# Patient Record
Sex: MALE | Race: WHITE | NOT HISPANIC OR LATINO | Employment: FULL TIME | URBAN - METROPOLITAN AREA
[De-identification: names, ages, dates, MRNs, and addresses within clinical notes are randomized per-mention and may not be internally consistent; named-entity substitution may affect disease eponyms.]

---

## 2020-07-09 ENCOUNTER — OFFICE VISIT (OUTPATIENT)
Dept: URGENT CARE | Facility: CLINIC | Age: 65
End: 2020-07-09
Payer: COMMERCIAL

## 2020-07-09 VITALS
HEIGHT: 66 IN | OXYGEN SATURATION: 96 % | DIASTOLIC BLOOD PRESSURE: 98 MMHG | WEIGHT: 175.6 LBS | HEART RATE: 70 BPM | SYSTOLIC BLOOD PRESSURE: 170 MMHG | BODY MASS INDEX: 28.22 KG/M2 | TEMPERATURE: 97.5 F | RESPIRATION RATE: 12 BRPM

## 2020-07-09 DIAGNOSIS — T14.8XXA WOUND INFECTION: ICD-10-CM

## 2020-07-09 DIAGNOSIS — L08.9 WOUND INFECTION: ICD-10-CM

## 2020-07-09 DIAGNOSIS — S61.502A OPEN WOUND OF LEFT WRIST, INITIAL ENCOUNTER: ICD-10-CM

## 2020-07-09 PROCEDURE — 99203 OFFICE O/P NEW LOW 30 MIN: CPT | Performed by: FAMILY MEDICINE

## 2020-07-09 RX ORDER — DOXYCYCLINE HYCLATE 100 MG
TABLET ORAL
Qty: 20 TAB | Refills: 0 | Status: SHIPPED | OUTPATIENT
Start: 2020-07-09

## 2020-07-10 NOTE — PROGRESS NOTES
Chief Complaint:    Chief Complaint   Patient presents with   • Laceration     Lt wrist x on saturday        History of Present Illness:    This is a new problem. He sustained wound to left wrist on 7/4/2020. He accidentally did something causing the watch he was wearing to cause wound to volar aspect of left wrist. He has been using Neosporin to wound and covering wound, but feels recently it is looking worse and getting more painful as if infection is setting in. Last tetanus immunization 2/2/18 per [employer].      Review of Systems:    Constitutional: Negative for fever, chills, and diaphoresis.   Eyes: Negative for change in vision, photophobia, pain, redness, and discharge.  ENT: Negative for ear pain, ear discharge, hearing loss, tinnitus, nasal congestion, nosebleeds, and sore throat.    Respiratory: Negative for cough, hemoptysis, sputum production, shortness of breath, wheezing, and stridor.    Cardiovascular: Negative for chest pain, palpitations, orthopnea, claudication, leg swelling, and PND.   Gastrointestinal: Negative for abdominal pain, nausea, vomiting, diarrhea, constipation, blood in stool, and melena.   Genitourinary: Negative for dysuria, urinary urgency, urinary frequency, hematuria, and flank pain.   Musculoskeletal: See HPI.  Skin: See HPI.   Neurological: Negative for dizziness, tingling, tremors, sensory change, speech change, focal weakness, seizures, loss of consciousness, and headaches.   Endo: Negative for polydipsia.   Heme: Does not bruise/bleed easily.   Psychiatric/Behavioral: Negative for depression, suicidal ideas, hallucinations, memory loss and substance abuse. The patient is not nervous/anxious and does not have insomnia.        Past Medical History:    Past Medical History:   Diagnosis Date   • Arthritis    • Cancer (HCC)    • Pain     back     Past Surgical History:    Past Surgical History:   Procedure Laterality Date   • GASTROSCOPY-ENDO  4/3/2014    Performed by Blas VENTURA  CELENA Holguin at ENDOSCOPY [facility] ORS   • PEG PLACEMENT  4/3/2014    Performed by Blas Holguin M.D. at ENDOSCOPY [facility] ORS   • OTHER  2/3/2014    tongue bx,resection rt neck mass     Social History:    Social History     Socioeconomic History   • Marital status: Single     Spouse name: Not on file   • Number of children: Not on file   • Years of education: Not on file   • Highest education level: Not on file   Occupational History   • Not on file   Social Needs   • Financial resource strain: Not on file   • Food insecurity     Worry: Not on file     Inability: Not on file   • Transportation needs     Medical: Not on file     Non-medical: Not on file   Tobacco Use   • Smoking status: Former Smoker     Packs/day: 2.00     Years: 10.00     Pack years: 20.00   • Smokeless tobacco: Never Used   Substance and Sexual Activity   • Alcohol use: Yes     Comment: 4-5 per daryl   • Drug use: No   • Sexual activity: Not on file   Lifestyle   • Physical activity     Days per week: Not on file     Minutes per session: Not on file   • Stress: Not on file   Relationships   • Social connections     Talks on phone: Not on file     Gets together: Not on file     Attends Bahai service: Not on file     Active member of club or organization: Not on file     Attends meetings of clubs or organizations: Not on file     Relationship status: Not on file   • Intimate partner violence     Fear of current or ex partner: Not on file     Emotionally abused: Not on file     Physically abused: Not on file     Forced sexual activity: Not on file   Other Topics Concern   • Not on file   Social History Narrative   • Not on file     Family History:    History reviewed. No pertinent family history.    Medications:    No current outpatient medications on file prior to visit.     No current facility-administered medications on file prior to visit.      Allergies:    No Known Allergies      Vitals:    Vitals:    07/09/20 1828   BP: (!)  "170/98   BP Location: Right arm   Patient Position: Sitting   BP Cuff Size: Adult long   Pulse: 70   Resp: 12   Temp: 36.4 °C (97.5 °F)   TempSrc: Temporal   SpO2: 96%   Weight: 79.7 kg (175 lb 9.6 oz)   Height: 1.676 m (5' 6\")       Physical Exam:    Constitutional: Vital signs reviewed. Appears well-developed and well-nourished. No acute distress.   Eyes: Sclera white, conjunctivae clear.   ENT: External ears normal. Hearing normal.   Neck: Neck supple.   Pulmonary/Chest: Respirations non-labored.   Musculoskeletal: Normal gait. Normal range of motion. No muscular atrophy or weakness.  Neurological: Alert and oriented to person, place, and time. Muscle tone normal. Coordination normal.   Skin: Left wrist volar aspect has superficial large linear and irregular wound, part is open. There is surrounding erythema of the wound and tenderness to palpation.  Psychiatric: Normal mood and affect. Behavior is normal. Judgment and thought content normal.       Assessment / Plan:    1. Open wound of left wrist, initial encounter  - doxycycline (VIBRAMYCIN) 100 MG Tab; 1 TAB BY MOUTH TWICE A DAY FOR 7-10 DAYS.  Dispense: 20 Tab; Refill: 0    2. Wound infection  - doxycycline (VIBRAMYCIN) 100 MG Tab; 1 TAB BY MOUTH TWICE A DAY FOR 7-10 DAYS.  Dispense: 20 Tab; Refill: 0      Discussed with him DDX, management options, and risks, benefits, and alternatives to treatment plan agreed upon.    Local wound care discussed. Advised wound will have to heal by secondary intention.    Agreeable to medication prescribed to treat for infection.    Discussed expected course of duration, time for improvement, and to seek follow-up in Emergency Room, urgent care, or with PCP if getting worse at any time or not improving within expected time frame.  "

## 2022-09-12 ENCOUNTER — HOSPITAL ENCOUNTER (EMERGENCY)
Facility: MEDICAL CENTER | Age: 67
End: 2022-09-12
Attending: EMERGENCY MEDICINE
Payer: COMMERCIAL

## 2022-09-12 ENCOUNTER — EH NON-PROVIDER (OUTPATIENT)
Dept: OCCUPATIONAL MEDICINE | Facility: CLINIC | Age: 67
End: 2022-09-12
Payer: COMMERCIAL

## 2022-09-12 ENCOUNTER — APPOINTMENT (OUTPATIENT)
Dept: RADIOLOGY | Facility: MEDICAL CENTER | Age: 67
End: 2022-09-12
Attending: EMERGENCY MEDICINE
Payer: COMMERCIAL

## 2022-09-12 ENCOUNTER — APPOINTMENT (OUTPATIENT)
Dept: RADIOLOGY | Facility: MEDICAL CENTER | Age: 67
End: 2022-09-12
Payer: COMMERCIAL

## 2022-09-12 VITALS
OXYGEN SATURATION: 92 % | HEIGHT: 66 IN | DIASTOLIC BLOOD PRESSURE: 77 MMHG | SYSTOLIC BLOOD PRESSURE: 148 MMHG | WEIGHT: 184.75 LBS | HEART RATE: 64 BPM | BODY MASS INDEX: 29.69 KG/M2 | RESPIRATION RATE: 15 BRPM | TEMPERATURE: 97.3 F

## 2022-09-12 DIAGNOSIS — Z02.83 ENCOUNTER FOR DRUG SCREENING: ICD-10-CM

## 2022-09-12 DIAGNOSIS — S61.511A LACERATION OF RIGHT WRIST, INITIAL ENCOUNTER: ICD-10-CM

## 2022-09-12 DIAGNOSIS — Z02.1 PRE-EMPLOYMENT DRUG SCREENING: ICD-10-CM

## 2022-09-12 DIAGNOSIS — S51.811A LACERATION OF RIGHT FOREARM, INITIAL ENCOUNTER: ICD-10-CM

## 2022-09-12 PROCEDURE — A9270 NON-COVERED ITEM OR SERVICE: HCPCS | Performed by: EMERGENCY MEDICINE

## 2022-09-12 PROCEDURE — 700102 HCHG RX REV CODE 250 W/ 637 OVERRIDE(OP): Performed by: EMERGENCY MEDICINE

## 2022-09-12 PROCEDURE — 304991 HCHG REPAIR-COMPLEX-LVL 1, ADD 5 CM

## 2022-09-12 PROCEDURE — 304999 HCHG REPAIR-SIMPLE/INTERMED LEVEL 1

## 2022-09-12 PROCEDURE — 29125 APPL SHORT ARM SPLINT STATIC: CPT

## 2022-09-12 PROCEDURE — 303747 HCHG EXTRA SUTURE

## 2022-09-12 PROCEDURE — 73090 X-RAY EXAM OF FOREARM: CPT | Mod: RT

## 2022-09-12 PROCEDURE — 304992 HCHG REPAIR-COMPLEX-LVL 1,1.1-7.5CM

## 2022-09-12 PROCEDURE — 700101 HCHG RX REV CODE 250: Performed by: EMERGENCY MEDICINE

## 2022-09-12 PROCEDURE — 73100 X-RAY EXAM OF WRIST: CPT | Mod: RT

## 2022-09-12 PROCEDURE — 99284 EMERGENCY DEPT VISIT MOD MDM: CPT

## 2022-09-12 PROCEDURE — 302875 HCHG BANDAGE ACE 4 OR 6""

## 2022-09-12 PROCEDURE — 80305 DRUG TEST PRSMV DIR OPT OBS: CPT | Performed by: NURSE PRACTITIONER

## 2022-09-12 PROCEDURE — 303353 HCHG DERMABOND SKIN ADHESIVE

## 2022-09-12 PROCEDURE — 304217 HCHG IRRIGATION SYSTEM

## 2022-09-12 RX ORDER — HYDROCODONE BITARTRATE AND ACETAMINOPHEN 5; 325 MG/1; MG/1
1 TABLET ORAL ONCE
Status: COMPLETED | OUTPATIENT
Start: 2022-09-12 | End: 2022-09-12

## 2022-09-12 RX ORDER — LIDOCAINE HYDROCHLORIDE AND EPINEPHRINE 10; 10 MG/ML; UG/ML
10 INJECTION, SOLUTION INFILTRATION; PERINEURAL ONCE
Status: COMPLETED | OUTPATIENT
Start: 2022-09-12 | End: 2022-09-12

## 2022-09-12 RX ORDER — CEPHALEXIN 500 MG/1
500 CAPSULE ORAL 4 TIMES DAILY
Qty: 20 CAPSULE | Refills: 0 | Status: SHIPPED | OUTPATIENT
Start: 2022-09-12 | End: 2022-09-17

## 2022-09-12 RX ORDER — LIDOCAINE HYDROCHLORIDE AND EPINEPHRINE 15; 5 MG/ML; UG/ML
10 INJECTION, SOLUTION EPIDURAL ONCE
Status: COMPLETED | OUTPATIENT
Start: 2022-09-12 | End: 2022-09-12

## 2022-09-12 RX ADMIN — HYDROCODONE BITARTRATE AND ACETAMINOPHEN 1 TABLET: 5; 325 TABLET ORAL at 08:17

## 2022-09-12 RX ADMIN — LIDOCAINE HYDROCHLORIDE,EPINEPHRINE BITARTRATE 10 ML: 15; .005 INJECTION, SOLUTION EPIDURAL; INFILTRATION; INTRACAUDAL; PERINEURAL at 12:16

## 2022-09-12 RX ADMIN — LIDOCAINE HYDROCHLORIDE,EPINEPHRINE BITARTRATE 10 ML: 10; .01 INJECTION, SOLUTION INFILTRATION; PERINEURAL at 10:17

## 2022-09-12 NOTE — DISCHARGE INSTRUCTIONS
Follow-up with the Workmen's Compensation clinic within the next 24 hours.  Please ask your employer which clinic you need to go to and then proceed to that clinic so the work comp doctor can evaluate you and finish the rest of the paperwork.    Also follow-up with Dr. Bradley, plastic surgeon, within the next 2 to 3 days.  Please call for appointment.    Return to the ER for any worsening arm pain, swelling to your forearm, increased pain in your forearm with movement of your fingers, fevers over 100.4, redness or swelling around the laceration sites, drainage of pus from the laceration sites, red streaks up your arm, shaking chills, numbness or tingling into your hand, discoloration to your hand, or for any concerns.    Suture removal will need to be in 10 days.    Please tell the plastic surgeon that the sutures in your forearm wound are Vicryl/dissolvable sutures and that there are 2 mattress sutures in there.

## 2022-09-12 NOTE — ED NOTES
ED tech and RN at bedside for irrigation. Per ERP, only irrigate radial laceration as forearm laceration has too much exposed.

## 2022-09-12 NOTE — ED NOTES
Pt ambulatory to room with a steady gait. Dressing to R arm/wrist in place, no active hemorrhage noted. Pt denies dizziness/lightheadedness at this time.   Chart up for ERP. Call light within reach.

## 2022-09-12 NOTE — ED PROVIDER NOTES
"ED Provider Note    Scribed for Jayde Schneider M.D. by Anni Huff. 9/12/2022  7:33 AM    Primary care provider: Christine Arriaza M.D.  Means of arrival: Walk-in  History obtained from: Patient  History limited by: None  CHIEF COMPLAINT  Chief Complaint   Patient presents with    Hand Laceration     R hand/wrist. Pt was working and the danielson of a roller came down onto R hand/R wrist. Bandage remains over laceration site, bleeding controlled at this time. New bandage applied in triage. Pt has full thickness lac to ulnar side of wrist/hand.    Wrist Laceration     Pt has large avulsion to radial side of R wrist extending into forearm. Bleeding controlled at this time       HPI  Juan Honeycutt is a 66 y.o. male who presents to the Emergency Department for further evaluation of a right forearm laceration onset 2 hours ago. The patient has associated right wrist laceration, right arm pain, and numbness in the tips of the ring and pinky finger. The patient is able to move fingers and hand. He notes that he was at work when he was working on a piece of heavy equipment with a danielson. He notes that the danielson came \"crashing down\" onto his right forearm/wrist as he was trying to change something under the danielson of this vehicle.  He describes the danielson as very heavy. He denies any history of diabetes. He notes he did not drive here. He states that he is left and right handed.  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  He notes that he filled out a workman's compensation form already.  His last tetanus shot was in 2018.     REVIEW OF SYSTEMS  Pertinent positives include right wrist laceration, right forearm laceration, right arm pain, and numbness in the ring and pinky finger.   Pertinent negatives include no inability to move right hand/fingers.   See HPI for further details. All other systems are negative.    PAST MEDICAL HISTORY  Past Medical History:   Diagnosis Date    Arthritis     Cancer " "(HCC)     Pain     back       FAMILY HISTORY  History reviewed. No pertinent family history.    SOCIAL HISTORY  Social History     Tobacco Use    Smoking status: Former     Packs/day: 2.00     Years: 10.00     Pack years: 20.00     Types: Cigarettes    Smokeless tobacco: Never   Vaping Use    Vaping Use: Never used   Substance Use Topics    Alcohol use: Yes     Comment: 4-5 per daryl    Drug use: No      Social History     Substance and Sexual Activity   Drug Use No       SURGICAL HISTORY  Past Surgical History:   Procedure Laterality Date    GASTROSCOPY-ENDO  4/3/2014    Performed by Blas Holguin M.D. at ENDOSCOPY [facility]    PEG PLACEMENT  4/3/2014    Performed by Blas Holguin M.D. at ENDOSCOPY [facility]    OTHER  2/3/2014    tongue bx,resection rt neck mass       CURRENT MEDICATIONS  Home Medications       Reviewed by Adelia Walker R.N. (Registered Nurse) on 09/12/22 at 0708  Med List Status: Partial     Medication Last Dose Status   doxycycline (VIBRAMYCIN) 100 MG Tab  Active                    ALLERGIES  No Known Allergies    PHYSICAL EXAM  VITAL SIGNS: /43   Pulse (!) 54   Temp 36.1 °C (96.9 °F) (Temporal)   Resp 16   Ht 1.676 m (5' 6\")   Wt 83.8 kg (184 lb 11.9 oz)   SpO2 95%   BMI 29.82 kg/m²    Constitutional: Alert in no apparent distress.  HENT: Normocephalic, Bilateral external ears normal. Nose normal.   Eyes: Pupils are equal and reactive. Conjunctiva normal, non-icteric.   Thorax & Lungs: Easy unlabored respirations  Skin: Visualized skin is  Dry, No erythema, No rash.   Extremities:   Right upper extremity: 9 x 2 cm laceration to the dorsal surface of the distal forearm with approximately 7 cm total of avulsed skin on either side of the deep portion of the laceration.  The laceration goes through the muscle.  The radial artery is visualized but does not appear to be lacerated or injured.  Patient denies any spurting blood.  There is no bleeding at this " time.  No obvious foreign objects. Laceration to the volar surface of the right wrist extending from the mid wrist to the ulnar aspect measuring 4.5 cm in total length. there is some visualized tendon with fascia overlying it.  Patient has full range of motion to his digits.  I do not see any obvious tendon laceration.  The tendon appears to be fully intact through all ranges of motion.  No active bleeding.  No obvious foreign body.  2+ radial pulse.  No significant swelling to the forearm.  No pain in the forearm with passive extension of the digits.  No concern for compartment syndrome.  Neurologic: Alert, Radial medial and ulnar nerve function intact  Psychiatric: Affect and Mood normal      LABS/RADIOLOGY/PROCEDURES    DX-FOREARM RIGHT   Final Result      1.  No radiographic evidence of an acute fracture or dislocation.   2.  Soft tissue defects at the mid forearm and ulnar aspect of the wrist.      DX-WRIST-LIMITED 2- RIGHT   Final Result      1.  Small osseous fragment projects anterior to the distal right radius. This likely represents sequela of remote trauma. Acute avulsion or chip fracture not excluded.   2.  Soft tissue defects at the forearm and right wrist.   3.  If there is concern for scaphoid injury, dedicated scaphoid views are recommended.        Patient reports old wrist fracture from when he was a teenager.  I suspect this is where the small osseous fragments anterior to the right distal radius is from.  There is no laceration overlying the distal radius so this is not open fracture.    LACERATION REPAIR PROCEDURE NOTE  The patient's identification was confirmed and consent was obtained.  This procedure was performed by Medical Student Derick Naidu with my (Dr. Schneider) supervision at 10:28 AM.  Site: Volar surface of right wrist starting at mid portion of wrist and extending onto the ulnar aspect of the wrist  Sterile procedures observed  Anesthetic used (type and amt): 1% Lidocaine with  Epinephrine 3 cc's to the wrist wound.    Suture type/size:4-0 Nylon  Length:4.5 cm   # of Sutures: Continuous suture with one simple interrupted suture  Technique:Continuous suture with one simple interrupted to better approximate the edge of the wound in the mid portion of the wrist  Complexity: Wound explored to full depth, one of the extensor tendons could be visualized but did have fascia overlying it, full range of motion to all digits without evidence of laceration to tendon, no obvious foreign body.  Antibx ointment applied  Tetanus UTD 2018    Suture removal be in 10 days.    Site anesthetized, irrigated with NS, explored without evidence of foreign body, wound well approximated, site covered with dry, sterile dressing. Patient tolerated procedure well without complications. Instructions for care discussed verbally and patient provided with additional written instructions for homecare and f/u.    LACERATION REPAIR PROCEDURE NOTE  The patient's identification was confirmed and consent was obtained.  This procedure was performed by Medical Student Derick Naidu with my (Dr. Schneider) supervision at 1:30 PM.   Site: Distal right forearm  Sterile procedures observed  Anesthetic used (type and amt): After anesthetizing and suturing the wrist laceration, the forearm laceration was then anesthetized using lidocaine 1% with epinephrine (10 cc)  Suture type/size: 2 four-point 0 nylon simple interrupted stitches along the radial aspect of the wound.  Dermis and exposed muscle was closed using 2 mattress sutures (four-point 0 absorbable Vicryl) as well as several other simple interrupted absorbable Vicryl.  Length: 9 x 2 cm  # of Sutures: See above.  2 sutures are removable and will need to be taken out in 10 days.  Technique: Mattress x2 of absorbable 4.0 Vicryl   Complexity: Complex  Dermabond was then used to glue the skin flap over the sutured portion of the dermis and muscle using the absorbable Vicryl.    Site  anesthetized, irrigated with NS, explored without evidence of foreign body, wound well approximated, site covered with dry, sterile dressing. Patient tolerated procedure well without complications. Instructions for care discussed verbally and patient provided with additional written instructions for homecare and f/u.       COURSE & MEDICAL DECISION MAKING  Pertinent Labs & Imaging studies reviewed. (See chart for details)        7:33 AM - Patient seen and examined at bedside. Discussed plan of care, including ordering imaging and most likely receiving stitches. I informed the patient that his tendons and nerves are intact. Patient agrees to the plan of care. The patient will be medicated with Norco 325 mg tablet. Ordered for imaging to evaluate his symptoms.    10:15 AM - I injected the patient's wounds with lidocaine.     10:28 AM Laceration repair preformed on the right wrist by medical student Derick Naidu with my observation. I informed the patient we would call plastic surgery about the laceration on his right forearm since there is minimal skin as well as skin flap left over the area with lacerated dermis and muscle belly.  Wanted to discuss case and secure follow-up with plastics for possible revision if necessary.    11:26 AM I discussed the patient's case and the above findings with Dr. Champagne (Vascular Surgery) who saw the patient here in the ER.  He notes his arm is vascularly intact and his radial artery is not damaged. He notes that the wound can be closed, but to consult Plastic Surgery for further evaluation.     11:50 AM Patient was reevaluated at bedside. I took measurements of the forearm laceration.    11:55 AM I discussed the patient's case and the above findings with Dr. Bradley (Plastic Surgery) who would like the wound to be closed here in the ER.  He says the wound would likely be fine but he would be happy to see the patient in his office in follow-up for consideration of possible revision etc.  if wound does not appear to be healing as it should.      12:45 PM-1:30 PM: Derick Naidu (Medical student) preformed the laceration repair of the right forearm laceration under my direction and observation.  It was decided that we would do absorbable sutures to close the dermis and muscle since there was a large skin flap that could be replaced over the lacerated region.  However, the skin flap was large and intact, so the laceration underlying where the flap used to be would need to be closed.  It was discussed that we would use 2 mattress stitches using the absorbable Vicryl as well as a few other Vicryl absorbable stitches to close that area.  Wound came together well.  We then used a some four-point 0 nylon to place 2 simple interrupted sutures at the radial aspect of that laceration to close a laceration that included lacerated epidermis.  The large skin flap was then carefully laid over the avulsed area and was well approximated.  There were only a few little areas where it looked like skin had been completely denuded and was gone.  Overall wound came together well.  We will place dressing and put patient in a volar splint to prevent excess movement as this wound heals.  Patient understands importance of follow-up with plastic surgery.    Work comp form was filled out and signed by myself.      Patient presents to the ER complaining of a laceration to the volar surface of his wrist which extends from the midportion of his wrist to the ulnar part of his wrist as well as a laceration to the dorsal arm where there is overlying skin tear/avulsion.  The wrist wound was anesthetized and explored.  Tendon was visualized but it is still covered by fascia/tendon sheath and did not appear to be injured.  I explored the wound through all ranges of motion of the fingers and I do not see any obvious tendon injury.  No foreign body.  This wound is linear and approximates nicely.  This wound was closed using a continuous  four-point 0 nylon suture and will need to removed in 10 days.  The laceration to the dorsal forearm was more complicated and that there was involvement of fat and muscle down to the radial artery.  The radial artery was visualized but was not injured.  I contacted Dr. Benavides, vascular surgeon.  He kindly came down to the ER to look at the patient's artery.  He says there is no arterial injury and no need for further imaging.  He says the wound can be closed.  No vascular issues at this time.  The wound was closed by myself and the medical student working with me.  It was decided that we would place absorbable stitches to close the dermis and muscle overlying this exposed radial artery and then use the avulsed skin which was hanging back in a V-shaped flap to then cover that repaired laceration using the absorbable stitches and then glue the skin back into place as best we can.  Patient is missing some of his skin which prevents us from completely covering all aspects of the avulsed wound.  However, the laceration has been fully repaired.  2 mattress stitches using the absorbable Vicryl were placed as well as a few other simple interrupted Vicryl's.  There were 2 four-point 0 nylon simple interrupted sutures which will need to be removed place at the radial side of that wound.  Dermabond was then used to cover the wound as best we could using the preserved skin tear/flap which remains attached at the more distal aspect of the injury.  Patient tolerated procedure well.  There is no swelling of the forearm.  No pain with passive extension of the digits.  He is neurovascular intact.  He had some slight tingling to the tips of the fourth and fifth digits, but overall the radial, medial, and ulnar nerve function as well as sensation was intact and normal.  He is can be following up with Dr. Bradley, plastic surgeon, and follow-up.  He is to follow-up with work comp as well since this is a work comp injury.  There is no  fracture seen on x-ray other than the subtle avulsion over the distal radius, but this is from old injury and there is no laceration overlying that area so no concern for open fracture.  Patient will go home on some Keflex given the depth of the injury.  He has been given very strict return precautions and discharge instructions.  He understands if he gets worse in any way he must return to the ER immediately.  Of note, he was a little hypotensive upon arrival.  Patient was observed here in the ER for many hours as we were consulting various specialties and working to repair his large lacerations.  He remained normotensive throughout all of his ED stay.  I think he might of gotten a little vasovagal out in triage but has maintained normal blood pressure back here in the ER the entire time.  No active bleeding.  I think he is safe and stable for outpatient management discharge home.  Has been given strict return precautions and discharge instructions and he understands treatment plan and follow-up.    The patient will return for new or worsening symptoms and is stable at the time of discharge.      DISPOSITION:  Patient will be discharged home in stable condition.    FOLLOW UP:  [facility] [address] [id number]  Schedule an appointment as soon as possible for a visit in 1 day  If symptoms worsen return to ER    Rafal Bradley M.D.  [address]  [facility] [address]  [phone]    Schedule an appointment as soon as possible for a visit in 2 days  If symptoms worsen return to ER      OUTPATIENT MEDICATIONS:  Discharge Medication List as of 9/12/2022  4:00 PM        START taking these medications    Details   cephALEXin (KEFLEX) 500 MG Cap Take 1 Capsule by mouth 4 times a day for 5 days., Disp-20 Capsule, R-0, Normal              FINAL IMPRESSION  1. Laceration of right wrist, initial encounter Acute   2. Laceration of right forearm, initial encounter  Acute         IAnni (Scribe), am scribing for, and in the presence of, Jayde Schneider M.D..    Electronically signed by: Anni Huff ([place]), 9/12/2022    Jayde KANG M.D. personally performed the services described in this documentation, as scribed by Anni Huff in my presence, and it is both accurate and complete.    This dictation has been created using voice recognition software. The accuracy of the dictation is limited by the abilities of the software. I expect there may be some errors of grammar and possibly content. I made every attempt to manually correct the errors within my dictation. However, errors related to voice recognition software may still exist and should be interpreted within the appropriate context.    The note accurately reflects work and decisions made by me.  Jayde Schneider M.D.  9/12/2022  6:02 PM

## 2022-09-12 NOTE — LETTER
"  FORM C-4:  EMPLOYEE’S CLAIM FOR COMPENSATION/ REPORT OF INITIAL TREATMENT  EMPLOYEE’S CLAIM - PROVIDE ALL INFORMATION REQUESTED   First Name Juan Last Name Tangela Birthdate 1955  Sex male Claim Number   Home Address [address] [place] [address]             Zip [address]                                   Age  66 y.o. Height  1.676 m (5' 6\") Weight  83.8 kg (184 lb 11.9 oz) [facility]  [id number]    [id number]   Mailing Address [address]              Zip [address] Telephone  [phone] (home)  Primary Language Spoken  English   Insurer   Third Party   [employer] Employee's Occupation (Job Title) When Injury or Occupational Disease Occurred     Employer's Name [employer] Telephone [phone]    Employer Address [address]] Zip [address]   Date of Injury  9/12/2022       Hour of Injury  6:00 AM Date Employer Notified  9/12/2022 Last Day of Work after Injury or Occupational Disease  9/12/2022 Supervisor to Whom Injury Reported  Bk Montes   Address or Location of Accident (if applicable) Work [1]   What were you doing at the time of accident? (if applicable) At Engine area    How did this injury or occupational disease occur? Be specific and answer in detail. Use additional sheet if necessary)  Engine compartent danielson fell hitting arm   If you believe that you have an occupational disease, when did you first have knowledge of the disability and it relationship to your employment? N/A Witnesses to the Accident  N/A   Nature of Injury or Occupational Disease  Laceration Part(s) of Body Injured or Affected  Wrist (R) and Hand (R), N/A, N/A    I CERTIFY THAT THE ABOVE IS TRUE AND CORRECT TO THE BEST OF MY KNOWLEDGE AND THAT I HAVE PROVIDED THIS INFORMATION IN ORDER TO OBTAIN THE BENEFITS OF [place] CHAPTER 617 OF [place]).  I HEREBY " AUTHORIZE ANY PHYSICIAN, CHIROPRACTOR, SURGEON, PRACTITIONER, OR OTHER PERSON, ANY HOSPITAL, INCLUDING [facility], ANY MEDICAL SERVICE ORGANIZATION, ANY INSURANCE COMPANY, OR OTHER INSTITUTION OR ORGANIZATION TO RELEASE TO EACH OTHER, ANY MEDICAL OR OTHER INFORMATION, INCLUDING BENEFITS PAID OR PAYABLE, PERTINENT TO THIS INJURY OR DISEASE, EXCEPT INFORMATION RELATIVE TO DIAGNOSIS, TREATMENT AND/OR COUNSELING FOR AIDS, PSYCHOLOGICAL CONDITIONS, ALCOHOL OR CONTROLLED SUBSTANCES, FOR WHICH I MUST GIVE SPECIFIC AUTHORIZATION.  A PHOTOSTAT OF THIS AUTHORIZATION SHALL BE AS VALID AS THE ORIGINAL.  Date 09/12/2022     [facility] EVELINEJudy                           Employee’s Signature   THIS REPORT MUST BE COMPLETED AND MAILED WITHIN 3 WORKING DAYS OF TREATMENT   Place [facility], EMERGENCY DEPT                       Name of Facility [facility]   Date  9/12/2022 Diagnosis  No diagnosis found. Is there evidence the injured employee was under the influence of alcohol and/or another controlled substance at the time of accident?   Hour  7:59 AM Description of Injury or Disease       Treatment     Have you advised the patient to remain off work five days or more?             X-Ray Findings    If Yes   From Date    To Date      From information given by the employee, together with medical evidence, can you directly connect this injury or occupational disease as job incurred?   If No, is employee capable of: Full Duty    Modified Duty      Is additional medical care by a physician indicated?   If Modified Duty, Specify any Limitations / Restrictions       Do you know of any previous injury or disease contributing to this condition or occupational disease?      Date 9/12/2022 Print Doctor’s Name Jayde Schneider certify the employer’s copy of this form was mailed on:   Address [address] [place] [id number] [phone] INSURER’S USE ONLY   Provider’s Tax  "ID Number   [place] Dept: [phone]    Doctor’s Signature   Degree  M.D.      Form C-4 (rev.10/07)                                                                         BRIEF DESCRIPTION OF RIGHTS AND BENEFITS  (Pursuant to [place] 616C.050)    Notice of Injury or Occupational Disease (Incident Report Form C-1): If an injury or occupational disease (OD) arises out of and in the course of employment, you must provide written notice to your employer as soon as practicable, but no later than 7 days after the accident or OD. Your employer shall maintain a sufficient supply of the required forms.    Claim for Compensation (Form C-4): If medical treatment is sought, the form C-4 is available at the place of initial treatment. A completed \"Claim for Compensation\" (Form C-4) must be filed within 90 days after an accident or OD. The treating physician or chiropractor must, within 3 working days after treatment, complete and mail to the employer, the employer's insurer and third-party , the Claim for Compensation.    Medical Treatment: If you require medical treatment for your on-the-job injury or OD, you may be required to select a physician or chiropractor from a list provided by your workers’ compensation insurer, if it has contracted with an Organization for Managed Care (MCO) or Preferred Provider Organization (PPO) or providers of health care. If your employer has not entered into a contract with an MCO or PPO, you may select a physician or chiropractor from the Panel of Physicians and Chiropractors. Any medical costs related to your industrial injury or OD will be paid by your insurer.    Temporary Total Disability (TTD): If your doctor has certified that you are unable to work for a period of at least 5 consecutive days, or 5 cumulative days in a 20-day period, or places restrictions on you that your employer does not accommodate, you may be entitled to TTD compensation.    Temporary Partial Disability " (TPD): If the wage you receive upon reemployment is less than the compensation for TTD to which you are entitled, the insurer may be required to pay you TPD compensation to make up the difference. TPD can only be paid for a maximum of 24 months.    Permanent Partial Disability (PPD): When your medical condition is stable and there is an indication of a PPD as a result of your injury or OD, within 30 days, your insurer must arrange for an evaluation by a rating physician or chiropractor to determine the degree of your PPD. The amount of your PPD award depends on the date of injury, the results of the PPD evaluation, your age and wage.    Permanent Total Disability (PTD): If you are medically certified by a treating physician or chiropractor as permanently and totally disabled and have been granted a PTD status by your insurer, you are entitled to receive monthly benefits not to exceed 66 2/3% of your average monthly wage. The amount of your PTD payments is subject to reduction if you previously received a lump-sum PPD award.    Vocational Rehabilitation Services: You may be eligible for vocational rehabilitation services if you are unable to return to the job due to a permanent physical impairment or permanent restrictions as a result of your injury or occupational disease.    Transportation and Per Melecio Reimbursement: You may be eligible for travel expenses and per melecio associated with medical treatment.    Reopening: You may be able to reopen your claim if your condition worsens after claim closure.     Appeal Process: If you disagree with a written determination issued by the insurer or the insurer does not respond to your request, you may appeal to the Department of Administration, , by following the instructions contained in your determination letter. You must appeal the determination within 70 days from the date of the determination letter at [address], [address], [place], [place]  [address], or [address], [address], [place], [place] [address]. If you disagree with the  decision, you may appeal to the Department of Administration, . You must file your appeal within 30 days from the date of the  decision letter at [address], [address], [place], [place] [address], or [address], [address], [place], [place] [address]. If you disagree with a decision of an , you may file a petition for judicial review with the District Court. You must do so within 30 days of the Appeal Officer’s decision. You may be represented by an  at your own expense or you may contact the [facility] for possible representation.    [place]  for Injured Workers (NAIW): If you disagree with a  decision, you may request that NAIW represent you without charge at an  Hearing. For information regarding denial of benefits, you may contact the [facility] at: [address], [address], [place] [address], [phone], or [address], [place] [address], [phone]    To File a Complaint with the Division: If you wish to file a complaint with the  of the Division of Industrial Relations (DIR),  please contact the Workers’ Compensation Section, [address] [facility] [facility] [address], [place], [place] [address], telephone [phone], or [address], [address], [place], [place] [address], telephone [phone].    For assistance with Workers’ Compensation Issues: You may contact the [facility] Office for Consumer Health Assistance, [address], [address], [place], [place] [address], Toll Free 1-746.382.3011, Web site: http://[facility].[place].gov/Programs/FLAKITA E-mail: [email or web address].[place].gov  D-2 (rev. 10/20)              __________________________________________________________________                                    __[facility]/2022____            Employee Name /  Signature                                                                                                                            Date

## 2022-09-12 NOTE — LETTER
"  FORM C-4:  EMPLOYEE’S CLAIM FOR COMPENSATION/ REPORT OF INITIAL TREATMENT  EMPLOYEE’S CLAIM - PROVIDE ALL INFORMATION REQUESTED   First Name Juan Last Name Tangela Birthdate 1955  Sex male Claim Number   Home Address [address] [place] [address]             Zip [address]                                   Age  66 y.o. Height  1.676 m (5' 6\") Weight  83.8 kg (184 lb 11.9 oz) [facility]  [id number]  [id number]   Mailing Address [address] [facility]              Zip [address] Telephone  [phone] (home)  Primary Language Spoken  English   Insurer   Third Party   [employer] Employee's Occupation (Job Title) When Injury or Occupational Disease Occurred     [employer]er[employer] Name [employer] Telephone [phone]    Employer Address [address] Zip [address]   Date of Injury  9/12/2022       Hour of Injury  6:00 AM Date Employer Notified  9/12/2022 Last Day of Work after Injury or Occupational Disease  9/12/2022 Supervisor to Whom Injury Reported  Bk Montes   Address or Location of Accident (if applicable) Work [1]   What were you doing at the time of accident? (if applicable) At [employer] area    How did this injury or occupational disease occur? Be specific and answer in detail. Use additional sheet if necessary)  Engine compartent danielson fell hitting arm   If you believe that you have an occupational disease, when did you first have knowledge of the disability and it relationship to your employment?  Witnesses to the Accident  N/A   Nature of Injury or Occupational Disease  Laceration Part(s) of Body Injured or Affected  Wrist (R) and Hand (R), N/A, N/A    I CERTIFY THAT THE ABOVE IS TRUE AND CORRECT TO THE BEST OF MY KNOWLEDGE AND THAT I HAVE PROVIDED THIS INFORMATION IN ORDER TO OBTAIN THE BENEFITS OF [place], [place] CHAPTER 617 [place]).  I HEREBY AUTHORIZE " ANY PHYSICIAN, CHIROPRACTOR, SURGEON, PRACTITIONER, OR OTHER PERSON, ANY HOSPITAL, INCLUDING [facility] OR [facility], ANY MEDICAL SERVICE ORGANIZATION, ANY INSURANCE COMPANY, OR OTHER INSTITUTION OR ORGANIZATION TO RELEASE TO EACH OTHER, ANY MEDICAL OR OTHER INFORMATION, INCLUDING BENEFITS PAID OR PAYABLE, PERTINENT TO THIS INJURY OR DISEASE, EXCEPT INFORMATION RELATIVE TO DIAGNOSIS, TREATMENT AND/OR COUNSELING FOR AIDS, PSYCHOLOGICAL CONDITIONS, ALCOHOL OR CONTROLLED SUBSTANCES, FOR WHICH I MUST GIVE SPECIFIC AUTHORIZATION.  A PHOTOSTAT OF THIS AUTHORIZATION SHALL BE AS VALID AS THE ORIGINAL.  Date  09/12/2022          [facility] SHANNON               Employee’s Signature   THIS REPORT MUST BE COMPLETED AND MAILED WITHIN 3 WORKING DAYS OF TREATMENT   Place [facility], EMERGENCY DEPT                       Name of Facility [facility]   Date  9/12/2022 Diagnosis  (S61.511A) Laceration of right wrist, initial encounter, Acute  (S51.811A) Laceration of right forearm, initial encounter, Acute Is there evidence the injured employee was under the influence of alcohol and/or another controlled substance at the time of accident?   Hour  3:07 PM Description of Injury or Disease  Laceration of right wrist, initial encounter  Laceration of right forearm, initial encounter No  Comments:No   Treatment  Heavy danielson of a large piece of equipment fell down onto patient's right arm this morning.  Evaluation and examination of the wounds, x-rays, vascular surgeon evaluated the forearm wound since there was exposure of radial artery.  Plastic surgery consultation.  Patient will need follow-up with plastics.  Have you advised the patient to remain off work five days or more?         No   X-Ray Findings  Negative  Comments:No fracture If Yes   From Date    To Date      From information given by the employee, together with medical evidence, can you directly connect this injury or  "occupational disease as job incurred? Yes  Comments:Yes If No, is employee capable of: Full Duty  No Modified Duty  Yes   Is additional medical care by a physician indicated? Yes  Comments:Work comp and plastic surgery If Modified Duty, Specify any Limitations / Restrictions   Must wear splint and keep wounds clean, covered and dry until seen by work comp and plastic surgery   Do you know of any previous injury or disease contributing to this condition or occupational disease? No  Comments:No    Date 9/12/2022 Print Doctor’s Name Genoveva Schneider certify the employer’s copy of this form was mailed on:   Address [address] [place] [id number] [phone] INSURER’S USE ONLY   Provider’s Tax ID Number   Telephone Dept: [phone]    Doctor’s Signature arik-GENOVEVA Amezquita M.D. Degree        Form C-4 (rev.10/07)                                                                         BRIEF DESCRIPTION OF RIGHTS AND BENEFITS  ([place]ursuant to [place] 616C.050)    Notice of Injury or Occupational Disease (Incident Report Form C-1): If an injury or occupational disease (OD) arises out of and in the course of employment, you must provide written notice to your employer as soon as practicable, but no later than 7 days after the accident or OD. Your employer shall maintain a sufficient supply of the required forms.    Claim for Compensation (Form C-4): If medical treatment is sought, the form C-4 is available at the place of initial treatment. A completed \"Claim for Compensation\" (Form C-4) must be filed within 90 days after an accident or OD. The treating physician or chiropractor must, within 3 working days after treatment, complete and mail to the employer, the employer's insurer and third-party , the Claim for Compensation.    Medical Treatment: If you require medical treatment for your on-the-job injury or OD, you may be required to select a physician or chiropractor from a list provided by your workers’ " compensation insurer, if it has contracted with an Organization for Managed Care (MCO) or Preferred Provider Organization (PPO) or providers of health care. If your employer has not entered into a contract with an MCO or PPO, you may select a physician or chiropractor from the Panel of Physicians and Chiropractors. Any medical costs related to your industrial injury or OD will be paid by your insurer.    Temporary Total Disability (TTD): If your doctor has certified that you are unable to work for a period of at least 5 consecutive days, or 5 cumulative days in a 20-day period, or places restrictions on you that your employer does not accommodate, you may be entitled to TTD compensation.    Temporary Partial Disability (TPD): If the wage you receive upon reemployment is less than the compensation for TTD to which you are entitled, the insurer may be required to pay you TPD compensation to make up the difference. TPD can only be paid for a maximum of 24 months.    Permanent Partial Disability (PPD): When your medical condition is stable and there is an indication of a PPD as a result of your injury or OD, within 30 days, your insurer must arrange for an evaluation by a rating physician or chiropractor to determine the degree of your PPD. The amount of your PPD award depends on the date of injury, the results of the PPD evaluation, your age and wage.    Permanent Total Disability (PTD): If you are medically certified by a treating physician or chiropractor as permanently and totally disabled and have been granted a PTD status by your insurer, you are entitled to receive monthly benefits not to exceed 66 2/3% of your average monthly wage. The amount of your PTD payments is subject to reduction if you previously received a lump-sum PPD award.    Vocational Rehabilitation Services: You may be eligible for vocational rehabilitation services if you are unable to return to the job due to a permanent physical impairment or  permanent restrictions as a result of your injury or occupational disease.    Transportation and Per Melecio Reimbursement: You may be eligible for travel expenses and per melecio associated with medical treatment.    Reopening: You may be able to reopen your claim if your condition worsens after claim closure.     Appeal Process: If you disagree with a written determination issued by the insurer or the insurer does not respond to your request, you may appeal to the Department of Administration, , by following the instructions contained in your determination letter. You must appeal the determination within 70 days from the date of the determination letter at [address], [address], [place], [place] [address], or [address] [facility] [address], [place], [place] [address]. If you disagree with the  decision, you may appeal to the Department of Administration, . You must file your appeal within 30 days from the date of the  decision letter at [address], [address], [place], [place] [address], or [address] [facility] [address], [facility] [address], [place], [place] [address]. If you disagree with a decision of an , you may file a petition for judicial review with the District Court. You must do so within 30 days of the Appeal Officer’s decision. You may be represented by an  at your own expense or you may contact the [facility] for possible representation.    [place]  for In[place] Workers ([place]IW): If you disagree with a  decision, you may request that NAIW represent you without charge at an  Hearing. For information regarding denial of benefits, you may contact the [facility] at: [address], [address], [place] [address], [phone], or [address] [facility] [address], [facility] [address], [place] [address], [phone]    To File a Complaint with the Division: If you wish to file a complaint with the  of the  Division of Industrial Relations (DIR),  please contact the Workers’ Compensation Section, [address], [address], [place], [place] [address], telephone (755) [phone], or [address], [address], [place], [place] [address], telephone [phone].    For assistance with Workers’ Compensation Issues: You may contact the [facility] Office for Consumer Health Assistance, [address], [address], [place], [place] [address], Toll Free 1-873.935.6008, Web site: http://[facility].nv.gov/Programs/NESTOR E-mail: [email or web address].gov  D-2 (rev. 10/20)              __________________________________________________________________                                   ___09/12/2022___            Employee Name / Signature                                                                                                                            Date

## 2022-09-12 NOTE — ED TRIAGE NOTES
Chief Complaint   Patient presents with    Hand Laceration     R hand/wrist. Pt was working and the danielson of a roller came down onto R hand/R wrist. Bandage remains over laceration site, bleeding controlled at this time. New bandage applied in triage. Pt has full thickness lac to ulnar side of wrist/hand.    Wrist Laceration     Pt has large avulsion to radial side of R wrist extending into forearm. Bleeding controlled at this time     Pt bradycardia and hypotensive in triage. Pt endorses being dizzy/lightheaded initially but denies now    Triage extremity injury xray protocols ordered    Pt to lobby, educated on triage/rooming process

## 2022-09-13 LAB
AMP AMPHETAMINE: NORMAL
COC COCAINE: NORMAL
INT CON NEG: NORMAL
INT CON POS: NORMAL
MET METHAMPHETAMINES: NORMAL
OPI OPIATES: NORMAL
PCP PHENCYCLIDINE: NORMAL
POC DRUG COMMENT 753798-OCCUPATIONAL HEALTH: NEGATIVE
THC: NORMAL